# Patient Record
Sex: MALE | Race: ASIAN
[De-identification: names, ages, dates, MRNs, and addresses within clinical notes are randomized per-mention and may not be internally consistent; named-entity substitution may affect disease eponyms.]

---

## 2020-11-30 PROBLEM — Z00.00 ENCOUNTER FOR PREVENTIVE HEALTH EXAMINATION: Status: ACTIVE | Noted: 2020-11-30

## 2020-12-14 ENCOUNTER — NON-APPOINTMENT (OUTPATIENT)
Age: 55
End: 2020-12-14

## 2020-12-14 ENCOUNTER — APPOINTMENT (OUTPATIENT)
Dept: CARDIOLOGY | Facility: CLINIC | Age: 55
End: 2020-12-14
Payer: COMMERCIAL

## 2020-12-14 VITALS
SYSTOLIC BLOOD PRESSURE: 152 MMHG | RESPIRATION RATE: 16 BRPM | OXYGEN SATURATION: 97 % | BODY MASS INDEX: 29.09 KG/M2 | TEMPERATURE: 98.1 F | HEART RATE: 75 BPM | DIASTOLIC BLOOD PRESSURE: 97 MMHG | HEIGHT: 66 IN | WEIGHT: 181 LBS

## 2020-12-14 DIAGNOSIS — Z86.19 PERSONAL HISTORY OF OTHER INFECTIOUS AND PARASITIC DISEASES: ICD-10-CM

## 2020-12-14 DIAGNOSIS — I10 ESSENTIAL (PRIMARY) HYPERTENSION: ICD-10-CM

## 2020-12-14 PROCEDURE — 93306 TTE W/DOPPLER COMPLETE: CPT

## 2020-12-14 PROCEDURE — 99204 OFFICE O/P NEW MOD 45 MIN: CPT

## 2020-12-14 PROCEDURE — 93000 ELECTROCARDIOGRAM COMPLETE: CPT

## 2020-12-14 PROCEDURE — 99072 ADDL SUPL MATRL&STAF TM PHE: CPT

## 2020-12-14 NOTE — DISCUSSION/SUMMARY
[FreeTextEntry1] : 55M with HTN presents to establish cardiovascular care\par \par 1. HTN\par -cont current regimen\par \par 2. recent covid\par -cont asa 81\par -will check tte for baseline LV function\par \par f/u 6 mo

## 2020-12-14 NOTE — PHYSICAL EXAM
[General Appearance - Well Developed] : well developed [Normal Appearance] : normal appearance [General Appearance - Well Nourished] : well nourished [Heart Rate And Rhythm] : heart rate and rhythm were normal [Heart Sounds] : normal S1 and S2 [Murmurs] : no murmurs present [Edema] : no peripheral edema present [] : no respiratory distress [Respiration, Rhythm And Depth] : normal respiratory rhythm and effort [Auscultation Breath Sounds / Voice Sounds] : lungs were clear to auscultation bilaterally [Bowel Sounds] : normal bowel sounds [Abdomen Soft] : soft [Abdomen Tenderness] : non-tender [Abnormal Walk] : normal gait [Skin Turgor] : normal skin turgor [Oriented To Time, Place, And Person] : oriented to person, place, and time [Impaired Insight] : insight and judgment were intact [Affect] : the affect was normal [Mood] : the mood was normal [FreeTextEntry1] : radial pulses 2+ b/l

## 2020-12-14 NOTE — HISTORY OF PRESENT ILLNESS
[FreeTextEntry1] : 55M with HTN presents to establish cardiovascular care\par Sent in by wife who is also my patient Mrs nicola hopkins\par [facility] \par pt had covid in october, now coming in for a check up. wants to make sure he has no post covid cardiac complications.\par pt overall feels well, pt states during the worst of his covid symptoms he had a feer and that was it. denies weakness or prolonged bedrest. \par \par pt denies cp, sob, dizziness, palpitations, syncope, n/v, jaw/arm pain, LE edema, orthopnea. pt does not currently exercise, pt states he can walk several miles a day over the summer without cp, sob. \par pt not on any diets. \par \par Prior cardiac workup: stress test a year ago, per pt was normal. \par Recent labs: at [facility], pt to bring in a copy\par \par Med hx: HTN\par Sx hx: none\par Fam hx: no known cardiac hx\par Social hx: lives in [facility] with wife, works as a , and software. denies tob use, social etoh, denies drug use. \par Meds: losartan 100 lopressor 25 bid hctz 25 asa 81\par Allergies: nkda\par \par

## 2020-12-14 NOTE — REVIEW OF SYSTEMS
[Fever] : no fever [Headache] : no headache [Recent Weight Gain (___ Lbs)] : no recent weight gain [Chills] : no chills [Feeling Fatigued] : not feeling fatigued [Recent Weight Loss (___ Lbs)] : no recent weight loss [Blurry Vision] : no blurred vision [Sore Throat] : no sore throat [Shortness Of Breath] : no shortness of breath [Dyspnea on exertion] : not dyspnea during exertion [Chest  Pressure] : no chest pressure [Chest Pain] : no chest pain [Lower Ext Edema] : no extremity edema [Leg Claudication] : no intermittent leg claudication [Palpitations] : no palpitations [Cough] : no cough [Wheezing] : no wheezing [Nausea] : no nausea [Vomiting] : no vomiting [Joint Pain] : no joint pain [Dizziness] : no dizziness [Confusion] : no confusion was observed [Excessive Thirst] : no polydipsia [Easy Bleeding] : no tendency for easy bleeding [Easy Bruising] : no tendency for easy bruising

## 2021-05-05 ENCOUNTER — APPOINTMENT (OUTPATIENT)
Dept: PULMONOLOGY | Facility: CLINIC | Age: 56
End: 2021-05-05
Payer: COMMERCIAL

## 2021-05-05 VITALS
WEIGHT: 167.13 LBS | HEART RATE: 80 BPM | HEIGHT: 66 IN | DIASTOLIC BLOOD PRESSURE: 90 MMHG | BODY MASS INDEX: 26.86 KG/M2 | TEMPERATURE: 97.8 F | RESPIRATION RATE: 16 BRPM | OXYGEN SATURATION: 98 % | SYSTOLIC BLOOD PRESSURE: 132 MMHG

## 2021-05-05 DIAGNOSIS — R06.83 SNORING: ICD-10-CM

## 2021-05-05 PROCEDURE — 99205 OFFICE O/P NEW HI 60 MIN: CPT

## 2021-05-05 PROCEDURE — 99072 ADDL SUPL MATRL&STAF TM PHE: CPT

## 2021-05-05 NOTE — ASSESSMENT
[FreeTextEntry1] : 55 y/o M with PMH of HTN and DM presented to the sleep clinic for an initial evaluation. His main complaint is snoring and nocturnal awakenings.  Him and his wife sleep in separate rooms because of his snorning.  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 The patient was referred to the [facility] for diagnostic HSAT for further assessment. The ramifications of obstructive sleep apnea and its potential therapeutic modalities were discussed with the patient. The dangers of drowsy driving were discussed with the patient.  The patient was warned to avoid drowsy driving. The patient will followup after results of this study are available.\par \par Bk Walker, DO\par Pulmonary, Critical Care and Sleep Medicine Attending

## 2021-05-05 NOTE — PHYSICAL EXAM
[General Appearance - Well Developed] : well developed [Normal Appearance] : normal appearance [Well Groomed] : well groomed [General Appearance - Well Nourished] : well nourished [No Deformities] : no deformities [General Appearance - In No Acute Distress] : no acute distress [Normal Conjunctiva] : the conjunctiva exhibited no abnormalities [Eyelids - No Xanthelasma] : the eyelids demonstrated no xanthelasmas [Neck Appearance] : the appearance of the neck was normal [Neck Cervical Mass (___cm)] : no neck mass was observed [Jugular Venous Distention Increased] : there was no jugular-venous distention [Thyroid Diffuse Enlargement] : the thyroid was not enlarged [Thyroid Nodule] : there were no palpable thyroid nodules [Heart Rate And Rhythm] : heart rate was normal and rhythm regular [Heart Sounds] : normal S1 and S2 [Heart Sounds Gallop] : no gallops [Murmurs] : no murmurs [Heart Sounds Pericardial Friction Rub] : no pericardial rub [Auscultation Breath Sounds / Voice Sounds] : lungs were clear to auscultation bilaterally [Abnormal Walk] : normal gait [Musculoskeletal - Swelling] : no joint swelling seen [Motor Tone] : muscle strength and tone were normal [Nail Clubbing] : no clubbing of the fingernails [Cyanosis, Localized] : no localized cyanosis [Petechial Hemorrhages (___cm)] : no petechial hemorrhages [Skin Color & Pigmentation] : normal skin color and pigmentation [Skin Turgor] : normal skin turgor [] : no rash [Deep Tendon Reflexes (DTR)] : deep tendon reflexes were 2+ and symmetric [Sensation] : the sensory exam was normal to light touch and pinprick [No Focal Deficits] : no focal deficits [Oriented To Time, Place, And Person] : oriented to person, place, and time [Impaired Insight] : insight and judgment were intact [Affect] : the affect was normal [II] : II [Low Lying Soft Palate] : low lying soft palate

## 2021-05-05 NOTE — HISTORY OF PRESENT ILLNESS
[Obstructive Sleep Apnea] : obstructive sleep apnea [Snoring] : snoring [Witnessed Apneas] : witnessed sleep apnea [Awakening With Dry Mouth] : awakening with dry mouth [Sleep Paralysis] : sleep paralysis [FreeTextEntry1] : 55 y/o M with PMH of HTN, COVID 8 month ago, DM presented to the sleep clinic for an initial evaluation. \par \par His main sleep related complaint is that he snores and wakes up in middle of night. Pt's wife notices apneic episodes. Pt has snored for his entire life. he has no complaints with his sleep. Pt goes to bed around midnight - 1am. It takes him approx. 30 mins to fall asleep. Pt wakes up once to use restroom. takes him less than 30 mins to fall back asleep. He wakes up around 8am. he feels refreshed when waking up. generally his energy level during the day is good. \par \par no restless leg symptoms. has sleep paralysis 1-2 times per year. No hypnagogic hallucinations. no other symptoms. \par \par \par ____________________________________________________________________\par EPWORTH SLEEPINESS SCALE\par \par How likely are you to doze off or fall asleep in the situations described below, in contrast to feeling just tired?  \par This refers to your usual way of life in recent times.  \par Even if you haven't done some of these things recently, try to work out how they would have affected you.\par Use the following scale to choose one most appropriate number for each situation.\par \par Chance of dozing.............Situation\par ...........0.............................Sitting and reading 0\par ...........0.............................Watching TV 0 \par ...........0.............................Sitting inactive in a public place (eg a theatre or a meeting) 0\par ...........1.............................As a passenger in a car for an hour without a break 0\par ...........1.............................Lying down to rest in the afternoon when circumstances permit 1\par ...........0.............................Sitting and talking to someone 0\par ...........0.............................Sitting quietly after lunch without alcohol 0 \par ...........0.............................In a car, while stopped for a few minutes in traffic 0\par \par ..........2..............................TOTAL SCORE 1\par \par 0 = Never would doze\par 1 = Slight chance of dozing\par 2 = Moderate chance of dozing\par 3 = High chance of dozing \par ______________________________________________________________________  [Unintentional Sleep while Active] : no unintentional sleep while active [Unintentional Sleep While Inactive] : no unintentional sleep while inactive [Awakes Unrefreshed] : does not awake unrefreshed [Awakes with Headache] : no headache upon awakening [Unusual Sleep Behavior] : no unusual sleep behavior [Hypnagogic Hallucinations] : no hallucinations when falling asleep [ESS] : 2

## 2021-05-05 NOTE — REVIEW OF SYSTEMS
[Negative] : Psychiatric [Nasal Congestion] : nasal congestion [Snoring] : snoring [Witnessed Apneas] : witnessed apnea [Sleep Paralysis] : sleep paralysis [EDS: ESS=____] : daytime somnolence: ESS=[unfilled] [Shortness Of Breath] : no shortness of breath [Chest Pain] : no chest pain [Obesity] : not obese [A.M. Headache] : no headache present upon awakening [Depression] : no depression [Anxious] : not anxious [Difficulty Initiating Sleep] : no difficulty falling asleep [Difficulty Maintaining Sleep] : no difficulty maintaining sleep [Irresistible urge to move legs] : no irresistible urge to move legs because of lower extremity discomfort [Unusual Sleep Behavior] : no unusual sleep behavior